# Patient Record
Sex: FEMALE | Race: ASIAN | NOT HISPANIC OR LATINO | URBAN - METROPOLITAN AREA
[De-identification: names, ages, dates, MRNs, and addresses within clinical notes are randomized per-mention and may not be internally consistent; named-entity substitution may affect disease eponyms.]

---

## 2022-12-20 ENCOUNTER — EMERGENCY (EMERGENCY)
Facility: HOSPITAL | Age: 47
LOS: 0 days | Discharge: HOME | End: 2022-12-20
Attending: EMERGENCY MEDICINE | Admitting: EMERGENCY MEDICINE

## 2022-12-20 VITALS
RESPIRATION RATE: 18 BRPM | HEART RATE: 74 BPM | SYSTOLIC BLOOD PRESSURE: 125 MMHG | OXYGEN SATURATION: 100 % | TEMPERATURE: 97 F | WEIGHT: 125 LBS | DIASTOLIC BLOOD PRESSURE: 80 MMHG
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
## 2022-12-20 DIAGNOSIS — H53.8 OTHER VISUAL DISTURBANCES: ICD-10-CM

## 2022-12-20 DIAGNOSIS — R51.9 HEADACHE, UNSPECIFIED: ICD-10-CM
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
## 2022-12-20 DIAGNOSIS — S09.90XA UNSPECIFIED INJURY OF HEAD, INITIAL ENCOUNTER: ICD-10-CM
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
## 2022-12-20 PROCEDURE — 99283 EMERGENCY DEPT VISIT LOW MDM: CPT
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
## 2022-12-20 NOTE — ED PROVIDER NOTE - NS ED ROS FT
ROS:   CONSTITUTIONAL: No weakness, fevers or chills   EYES/ENT: No visual changes; No vertigo or throat pain   NECK: No pain or stiffness   RESPIRATORY: No cough, wheezing, hemoptysis; No shortness of breath   CARDIOVASCULAR: No chest pain or palpitations   GASTROINTESTINAL: No abdominal or epigastric pain. No nausea, vomiting, or hematemesis; No diarrhea or constipation. No melena or hematochezia.  GENITOURINARY: No dysuria, frequency or hematuria  NEUROLOGICAL: No numbness or weakness  SKIN: No itching, rashes

## 2022-12-20 NOTE — ED PROVIDER NOTE - PATIENT PORTAL LINK FT
You can access the FollowMyHealth Patient Portal offered by [facility] by registering at the following website: [email or web address]. By joining [facility]’s FollowMyHealth portal, you will also be able to view your health information using other applications (apps) compatible with our system.

## 2022-12-20 NOTE — ED PROVIDER NOTE - NSFOLLOWUPINSTRUCTIONS_ED_ALL_ED_FT
You have been diagnosed with a head injury. The symptoms associated with a head injury may vary. This may impact your ability to partake in normal daily activities. It is important you get plenty of rest and do not engage in physically demanding activities until your doctor clears you to do so. The following instructions are guidelines for convalescence.    You should not be left alone. Have a relative or friend stay with you until they feel as though you're back to normal.  Do not drink alcoholic beverages.  Avoid strenuous activities. No heavy lifting, bending, or straining.  Do not drive or operate heavy machinery until your doctor clears you to do so.  Initially you may experience mild headaches, mild depression, difficulty concentrating, or dizziness. You may also experience nausea. These symptoms can last days to weeks.  Call your doctor immediately or return to the emergency department if you experiencing any of the following:    Persistent nausea or vomiting.  Increased confusion, drowsiness or changed in your level consciousness.  Increased dizziness trouble walking or staggering.  Seizures or convulsions, or weakness in an arm or leg.  Worsening headache.  Clear or blood tinged drainage from your ears or nose.  Double vision or difficulty with your eyesight.
